# Patient Record
Sex: FEMALE | Race: WHITE
[De-identification: names, ages, dates, MRNs, and addresses within clinical notes are randomized per-mention and may not be internally consistent; named-entity substitution may affect disease eponyms.]

---

## 2019-07-30 ENCOUNTER — HOSPITAL ENCOUNTER (INPATIENT)
Dept: HOSPITAL 10 - L-D | Age: 29
LOS: 3 days | Discharge: HOME | End: 2019-08-02
Attending: OBSTETRICS & GYNECOLOGY | Admitting: OBSTETRICS & GYNECOLOGY
Payer: COMMERCIAL

## 2019-07-30 ENCOUNTER — HOSPITAL ENCOUNTER (INPATIENT)
Dept: HOSPITAL 91 - L-D | Age: 29
LOS: 3 days | Discharge: HOME | End: 2019-08-02
Payer: COMMERCIAL

## 2019-07-30 VITALS — RESPIRATION RATE: 19 BRPM | HEART RATE: 83 BPM | SYSTOLIC BLOOD PRESSURE: 111 MMHG | DIASTOLIC BLOOD PRESSURE: 63 MMHG

## 2019-07-30 VITALS — HEART RATE: 96 BPM | DIASTOLIC BLOOD PRESSURE: 61 MMHG | RESPIRATION RATE: 18 BRPM | SYSTOLIC BLOOD PRESSURE: 106 MMHG

## 2019-07-30 VITALS
WEIGHT: 214.51 LBS | BODY MASS INDEX: 39.47 KG/M2 | HEIGHT: 62 IN | BODY MASS INDEX: 39.47 KG/M2 | HEIGHT: 62 IN | WEIGHT: 214.51 LBS

## 2019-07-30 DIAGNOSIS — O34.211: Primary | ICD-10-CM

## 2019-07-30 DIAGNOSIS — Z3A.39: ICD-10-CM

## 2019-07-30 DIAGNOSIS — Z30.2: ICD-10-CM

## 2019-07-30 DIAGNOSIS — E66.9: ICD-10-CM

## 2019-07-30 LAB
ADD MAN DIFF?: NO
BASOPHIL #: 0 10^3/UL (ref 0–0.1)
BASOPHILS %: 0.3 % (ref 0–2)
EOSINOPHILS #: 0.1 10^3/UL (ref 0–0.5)
EOSINOPHILS %: 0.6 % (ref 0–7)
HEMATOCRIT: 35.6 % (ref 37–47)
HEMOGLOBIN: 11.7 G/DL (ref 12–16)
HEPATITIS B SURFACE ANTIGEN: NEGATIVE
IMMATURE GRANS #M: 0.1 10^3/UL (ref 0–0.03)
IMMATURE GRANS % (M): 1.1 % (ref 0–0.43)
INR: 0.9
LYMPHOCYTES #: 1.6 10^3/UL (ref 0.8–2.9)
LYMPHOCYTES %: 17 % (ref 15–51)
MEAN CORPUSCULAR HEMOGLOBIN: 27.5 PG (ref 29–33)
MEAN CORPUSCULAR HGB CONC: 32.9 G/DL (ref 32–37)
MEAN CORPUSCULAR VOLUME: 83.6 FL (ref 82–101)
MEAN PLATELET VOLUME: 8.6 FL (ref 7.4–10.4)
MONOCYTE #: 0.8 10^3/UL (ref 0.3–0.9)
MONOCYTES %: 8.2 % (ref 0–11)
NEUTROPHIL #: 6.9 10^3/UL (ref 1.6–7.5)
NEUTROPHILS %: 72.8 % (ref 39–77)
NUCLEATED RED BLOOD CELLS #: 0 10^3/UL (ref 0–0)
NUCLEATED RED BLOOD CELLS%: 0 /100WBC (ref 0–0)
PARTIAL THROMBOPLASTIN TIME: 29.4 SEC (ref 23–35)
PLATELET COUNT: 328 10^3/UL (ref 140–415)
PROTIME: 12.3 SEC (ref 11.9–14.9)
PT RATIO: 1
RAPID PLASMA REAGIN: NONREACTIVE
RED BLOOD COUNT: 4.26 10^6/UL (ref 4.2–5.4)
RED CELL DISTRIBUTION WIDTH: 13.2 % (ref 11.5–14.5)
WHITE BLOOD COUNT: 9.5 10^3/UL (ref 4.8–10.8)

## 2019-07-30 PROCEDURE — 87340 HEPATITIS B SURFACE AG IA: CPT

## 2019-07-30 PROCEDURE — 86900 BLOOD TYPING SEROLOGIC ABO: CPT

## 2019-07-30 PROCEDURE — 0UB70ZZ EXCISION OF BILATERAL FALLOPIAN TUBES, OPEN APPROACH: ICD-10-PCS | Performed by: OBSTETRICS & GYNECOLOGY

## 2019-07-30 PROCEDURE — 86850 RBC ANTIBODY SCREEN: CPT

## 2019-07-30 PROCEDURE — 85610 PROTHROMBIN TIME: CPT

## 2019-07-30 PROCEDURE — 85730 THROMBOPLASTIN TIME PARTIAL: CPT

## 2019-07-30 PROCEDURE — 0UB70ZZ EXCISION OF BILATERAL FALLOPIAN TUBES, OPEN APPROACH: ICD-10-PCS

## 2019-07-30 PROCEDURE — 85025 COMPLETE CBC W/AUTO DIFF WBC: CPT

## 2019-07-30 PROCEDURE — 88302 TISSUE EXAM BY PATHOLOGIST: CPT

## 2019-07-30 PROCEDURE — 86901 BLOOD TYPING SEROLOGIC RH(D): CPT

## 2019-07-30 PROCEDURE — 86592 SYPHILIS TEST NON-TREP QUAL: CPT

## 2019-07-30 RX ADMIN — IBUPROFEN SCH MG: 800 TABLET ORAL at 22:00

## 2019-07-30 RX ADMIN — CEFAZOLIN SODIUM 1 MLS/HR: 2 SOLUTION INTRAVENOUS at 21:13

## 2019-07-30 RX ADMIN — DOCUSATE SODIUM AND SENNOSIDES SCH TAB: 8.6; 5 TABLET, FILM COATED ORAL at 21:16

## 2019-07-30 RX ADMIN — KETOROLAC TROMETHAMINE 1 MG: 30 INJECTION, SOLUTION INTRAMUSCULAR at 18:28

## 2019-07-30 RX ADMIN — Medication 1 MLS/HR: at 21:16

## 2019-07-30 RX ADMIN — Medication 1 APPLIC: at 21:13

## 2019-07-30 RX ADMIN — IBUPROFEN 1 MG: 800 TABLET, FILM COATED ORAL at 22:00

## 2019-07-30 RX ADMIN — ACETAMINOPHEN 1 MG: 500 TABLET, FILM COATED ORAL at 18:39

## 2019-07-30 RX ADMIN — CEFAZOLIN SODIUM 1 MLS/HR: 2 SOLUTION INTRAVENOUS at 19:31

## 2019-07-30 RX ADMIN — AZITHROMYCIN MONOHYDRATE 1 MLS/HR: 500 INJECTION, POWDER, LYOPHILIZED, FOR SOLUTION INTRAVENOUS at 18:39

## 2019-07-30 RX ADMIN — PYRIDOXINE HYDROCHLORIDE 1 MLS/HR: 100 INJECTION, SOLUTION INTRAMUSCULAR; INTRAVENOUS at 19:59

## 2019-07-30 RX ADMIN — DOCUSATE SODIUM AND SENNOSIDES 1 TAB: 8.6; 5 TABLET, FILM COATED ORAL at 21:16

## 2019-07-30 NOTE — HP
Date/Time of Note


Date/Time of Note


DATE: 19 


TIME: 16:50





OB - History


Hx of Present Pregnancy


Free Text/Dictation


28-year-old female  3 para 2 with previous  x1 at 39+ weeks 


admitted for repeat section and a sterilization procedure


Last Menstrual Period:  Oct 30, 2018


Estimated Due Date:  Aug 6, 2019


:  3


Para:  2


Prenatal Care:  Good Care


Ultrasounds:  Normal mid trimester US


Obstetrical Complications:  None


Medical Complications:  None, Other (Previous  )





Past Family/Social History


*


Past Medical, Surgical, Family and Obstetric Histories reviewed from prenatal 


chart.


Blood Type:  O+


Rubella:  immune


RPR/VDRL:  Negative


GBS Status:  Negative


HBsAG:  Negative





OB  Admission Exam


Physical Exam


HEENT:  WNL


Heart:  Rhythm Normal


Lungs:  Clear, Equal


Abdomen:  WNL


Extremities:  Normal


Reflexes:  Normal


Cervical Dilatation:  None


Effacement:  0%


Station:  -3


Membranes:  Intact


Fetal Heart Rate:  140's


Accelerations:  Accelerations Present


Decelerations:  No Decelerations


Varibility:  Marked


Contractions on Admission:  None


Last 72 hours Lab Results


                                    CBC & BMP


19 14:30











OB  Assessment/Plan


Reason for admission:   section


Other Assessment:


Term gestation


Previous 


Desires sterilization


Other plan:


Repeat  and tubal ligation











ASHLEE LEONARDO MD   2019 16:54

## 2019-07-30 NOTE — PREAC
Date/Time of Note


Date/Time of Note


DATE: 7/30/19 


TIME: 14:54





Anesthesia Eval and Record


Evaluation


Time Pre-Procedure Interview


DATE: 7/30/19 


TIME: 14:54


Age


28


Sex


female


NPO:  8 hrs


Preoperative diagnosis


IUP AT 39 WEEKS


Planned procedure


REPEAT C SECTION





Past Medical History


Past Medical History:  Includes


GI:  Obesity





Surgery & Anesthesia Issues


No known issue





Meds


Anticoagulation:  No


Beta Blocker within 24 hr:  No


Reason Beta Blocker not given:  Pt. not on B-Blocker





Current Medications


Lactated Ringer's 1,000 ml @  125 mls/hr Q8H IV ;  Start 7/30/19 at 14:07


Cefazolin Sodium/ Dextrose 50 ml @  100 mls/hr ONCE IVPB ;  Start 7/30/19 at 


14:30


Oxytocin/Lactated Ringer's 500 ml @  125 mls/hr POST PARTUM IV ;  Start 7/30/19 


at 14:30


Oxytocin/Lactated Ringer's 500 ml @ 0 mls/hr ONCE PRN IV .VAGINAL BLEEDING;  


Start 7/30/19 at 14:30


Methylergonovine Maleate (Methergine) 0.2 mg ONCE PRN IM .VAGINAL BLEEDING;  


Start 7/30/19 at 14:30


Carboprost Tromethamine (Hemabate) 250 mcg ONCE PRN IM .VAGINAL BLEEDING;  Start


7/30/19 at 14:30


Misoprostol (Cytotec) 1,000 mcg ONCE PRN WI .VAGINAL BLEEDING;  Start 7/30/19 at


14:30


Meds reviewed:  Yes





Allergies


Coded Allergies:  


     No Known Allergies (Verified  Allergy, Unknown, 7/30/19)


Allergies Reviewed:  Yes





Labs/Studies


Labs Reviewed:  Reviewed by anesthesiologist


Pregnancy test:  Positive





Pre-procedure Exam


Airway:  Adequate mouth opening, Adequate thyromental dist


Mallampati:  Mallampati II


Teeth:  Normal


Lung:  Normal


Heart:  Normal





ASA Physical Status


ASA physical status:  2


Emergency:  None





Planned Anesthetic


Neuraxial:  Spinal





Planned Pain Management


Sub-arachniod narcotics





Pre-operative Attestations


Prior to commencing anesthesia and surgery, the patient was re-evaluated, there 


was verification of:


*The patient's identity


*The results of appropriate recent lab work and preoperative vital signs


*The above evaluation not changing prior to induction


*Anesthetic plan, risk benefits, alternative and complications discussed with 


patient/family; questions answered; patient/family understands, accepts and 


wishes to proceed.











MAGO GENTILE                 Jul 30, 2019 14:55

## 2019-07-30 NOTE — OPR
Operative Report


Planned Procedure


Procedure date


2019


Procedure(s)


Repeat  and bilateral tubal ligation


Performed by


see signature line


Assistant:  CHRIS ARAGON M.D.


Anesthesiologist:  MAGO GENTILE


Pre-procedure diagnosis


Term gestation


Previous 


Desired sterilization


                 [id number]
Anesthesia Type:  Clsqy6m
spinal








Post-Procedure


Post-procedure diagnosis


Status post repeat  and tubal ligation


Findings


Live Baby in MARIAMA position


Clear amniotic fluid


Normal-appearing right and left fallopian tubes and ovaries


Estimated Blood Loss:  500 - 600 mls


Specimen(s)


Segments of right and left fallopian tubes


Grafts/Implant(s)


none


Complication(s)


none


Pt Condition post procedure:  stable


Disposition:  PACU


Procedure Description


Under satisfactory anaesthesia a Pfannenstiel incision was made two 


fingerbreadth above and parallel to the symphysis of pubis around the previous 


scar and previous scar was removed 


Incision was extended laterally to the border of the Recti muscles on either 


sides. Incision was carried down with sharp and blunt dissection until fascia 


was reached. Anterior Recti muscle fascia was incised in mid portion and 


incision extended laterally to the border of skin incision. Fascia was mobilized


from muscle superiorly and Recti muscles were  from midline using sharp


and blunt dissection.


Peritoneum was visualized; Avoiding bowel and bladder it was incised . Incision 


was extended superiorly and inferiorly. Bladder blade was placed. Posterior 


peritoneum covering the lower segment of the uterus and lower segment of the 


uterus were incised.Low transverse uterine incision was made on lower segment of


the uterus. Incision extended laterally to the border of Round Lig. on either 


sides and baby was delivered from  OT.  position . Amniotic fluid appeared 


clear. Cord blood was obtained and cord had 3 vessels . 


Placenta was delivered spontaneously and appeared intact and complete.


Intrauterine cavity was rubbed with a laparotomy sponge. Uterine incision was 


closed in 2 layers using running stitches of No1 Monocryl. Hemostasis appeared 


secure. Ovaries and Fallopian tubes were within normal limits.


Bilateral Tubal Ligation was performed by following procedure: R fallopian tube 


was raised in mid portion; a Hilary clamp was placed below the fimbriae extending


to proximal portion of the fallopian tube. Another clamp was placed parallel to 


the first and after incising the fallopian tube the stump was sutured using  0 


Vicryl stitch. Hemostasis was secure . Same procedure was done on fallopian tube


on the opposite side. Hemostasis appeared to be secure on ligated sites of 


either fallopian tubes.


Announcing needle, lap sponge and instrument count to be correct abdomen was 


closed in layers as follows:


Peritoneum and Recti muscles with running stitches of 2-0 Vicryl. Fascia with 


running stitch of No 1 PDS. Subcutaneous tissue with running stitches of 2-0 


Monocryl and skin was closed using staples. 


Patient tolerated the procedure well and was transferred to [facility] in good 


condition.











ASHLEE LEONARDO MD   2019 16:56

## 2019-07-31 VITALS — DIASTOLIC BLOOD PRESSURE: 59 MMHG | SYSTOLIC BLOOD PRESSURE: 101 MMHG | RESPIRATION RATE: 18 BRPM | HEART RATE: 75 BPM

## 2019-07-31 VITALS — SYSTOLIC BLOOD PRESSURE: 98 MMHG | DIASTOLIC BLOOD PRESSURE: 52 MMHG | HEART RATE: 67 BPM | RESPIRATION RATE: 19 BRPM

## 2019-07-31 VITALS — DIASTOLIC BLOOD PRESSURE: 68 MMHG | SYSTOLIC BLOOD PRESSURE: 112 MMHG | RESPIRATION RATE: 17 BRPM | HEART RATE: 78 BPM

## 2019-07-31 VITALS — DIASTOLIC BLOOD PRESSURE: 58 MMHG | SYSTOLIC BLOOD PRESSURE: 99 MMHG | HEART RATE: 73 BPM | RESPIRATION RATE: 18 BRPM

## 2019-07-31 VITALS — DIASTOLIC BLOOD PRESSURE: 58 MMHG | SYSTOLIC BLOOD PRESSURE: 102 MMHG | HEART RATE: 76 BPM | RESPIRATION RATE: 18 BRPM

## 2019-07-31 VITALS — DIASTOLIC BLOOD PRESSURE: 63 MMHG | SYSTOLIC BLOOD PRESSURE: 111 MMHG | HEART RATE: 87 BPM | RESPIRATION RATE: 18 BRPM

## 2019-07-31 LAB
ADD MAN DIFF?: NO
BASOPHIL #: 0 10^3/UL (ref 0–0.1)
BASOPHILS %: 0.1 % (ref 0–2)
EOSINOPHILS #: 0 10^3/UL (ref 0–0.5)
EOSINOPHILS %: 0 % (ref 0–7)
HEMATOCRIT: 29 % (ref 37–47)
HEMOGLOBIN: 9.6 G/DL (ref 12–16)
IMMATURE GRANS #M: 0.12 10^3/UL (ref 0–0.03)
IMMATURE GRANS % (M): 0.8 % (ref 0–0.43)
LYMPHOCYTES #: 1.4 10^3/UL (ref 0.8–2.9)
LYMPHOCYTES %: 9.1 % (ref 15–51)
MEAN CORPUSCULAR HEMOGLOBIN: 28 PG (ref 29–33)
MEAN CORPUSCULAR HGB CONC: 33.1 G/DL (ref 32–37)
MEAN CORPUSCULAR VOLUME: 84.5 FL (ref 82–101)
MEAN PLATELET VOLUME: 8.7 FL (ref 7.4–10.4)
MONOCYTE #: 1.1 10^3/UL (ref 0.3–0.9)
MONOCYTES %: 7.7 % (ref 0–11)
NEUTROPHIL #: 12.3 10^3/UL (ref 1.6–7.5)
NEUTROPHILS %: 82.3 % (ref 39–77)
NUCLEATED RED BLOOD CELLS #: 0 10^3/UL (ref 0–0)
NUCLEATED RED BLOOD CELLS%: 0 /100WBC (ref 0–0)
PLATELET COUNT: 291 10^3/UL (ref 140–415)
RED BLOOD COUNT: 3.43 10^6/UL (ref 4.2–5.4)
RED CELL DISTRIBUTION WIDTH: 13.2 % (ref 11.5–14.5)
WHITE BLOOD COUNT: 14.9 10^3/UL (ref 4.8–10.8)

## 2019-07-31 RX ADMIN — PYRIDOXINE HYDROCHLORIDE SCH MLS/HR: 100 INJECTION, SOLUTION INTRAMUSCULAR; INTRAVENOUS at 11:42

## 2019-07-31 RX ADMIN — CLINDAMYCIN HYDROCHLORIDE 1 MG: 300 CAPSULE ORAL at 18:44

## 2019-07-31 RX ADMIN — CLINDAMYCIN HYDROCHLORIDE 1 MG: 300 CAPSULE ORAL at 11:43

## 2019-07-31 RX ADMIN — IBUPROFEN SCH MG: 800 TABLET ORAL at 14:00

## 2019-07-31 RX ADMIN — CLINDAMYCIN HYDROCHLORIDE SCH MG: 300 CAPSULE ORAL at 11:43

## 2019-07-31 RX ADMIN — IBUPROFEN 1 MG: 800 TABLET, FILM COATED ORAL at 14:00

## 2019-07-31 RX ADMIN — KETOROLAC TROMETHAMINE PRN MG: 30 INJECTION, SOLUTION INTRAMUSCULAR at 04:16

## 2019-07-31 RX ADMIN — IBUPROFEN 1 MG: 800 TABLET, FILM COATED ORAL at 06:00

## 2019-07-31 RX ADMIN — CLINDAMYCIN HYDROCHLORIDE 1 MG: 300 CAPSULE ORAL at 23:42

## 2019-07-31 RX ADMIN — CEFAZOLIN SODIUM 1 MLS/HR: 2 SOLUTION INTRAVENOUS at 04:16

## 2019-07-31 RX ADMIN — PYRIDOXINE HYDROCHLORIDE 1 MLS/HR: 100 INJECTION, SOLUTION INTRAMUSCULAR; INTRAVENOUS at 18:45

## 2019-07-31 RX ADMIN — OXYCODONE HYDROCHLORIDE AND ACETAMINOPHEN 1 TAB: 5; 325 TABLET ORAL at 18:59

## 2019-07-31 RX ADMIN — CEFAZOLIN SODIUM 1 MLS/HR: 2 SOLUTION INTRAVENOUS at 11:42

## 2019-07-31 RX ADMIN — IBUPROFEN 1 MG: 800 TABLET, FILM COATED ORAL at 22:11

## 2019-07-31 RX ADMIN — PYRIDOXINE HYDROCHLORIDE 1 MLS/HR: 100 INJECTION, SOLUTION INTRAMUSCULAR; INTRAVENOUS at 11:42

## 2019-07-31 RX ADMIN — CLINDAMYCIN HYDROCHLORIDE SCH MG: 300 CAPSULE ORAL at 18:44

## 2019-07-31 RX ADMIN — CLINDAMYCIN HYDROCHLORIDE SCH MG: 300 CAPSULE ORAL at 05:38

## 2019-07-31 RX ADMIN — CLINDAMYCIN HYDROCHLORIDE SCH MG: 300 CAPSULE ORAL at 00:45

## 2019-07-31 RX ADMIN — KETOROLAC TROMETHAMINE PRN MG: 30 INJECTION, SOLUTION INTRAMUSCULAR at 12:36

## 2019-07-31 RX ADMIN — CLINDAMYCIN HYDROCHLORIDE 1 MG: 300 CAPSULE ORAL at 00:45

## 2019-07-31 RX ADMIN — DOCUSATE SODIUM AND SENNOSIDES SCH TAB: 8.6; 5 TABLET, FILM COATED ORAL at 22:10

## 2019-07-31 RX ADMIN — CLINDAMYCIN HYDROCHLORIDE SCH MG: 300 CAPSULE ORAL at 23:42

## 2019-07-31 RX ADMIN — DOCUSATE SODIUM AND SENNOSIDES SCH TAB: 8.6; 5 TABLET, FILM COATED ORAL at 09:00

## 2019-07-31 RX ADMIN — PYRIDOXINE HYDROCHLORIDE SCH MLS/HR: 100 INJECTION, SOLUTION INTRAMUSCULAR; INTRAVENOUS at 18:45

## 2019-07-31 RX ADMIN — DOCUSATE SODIUM AND SENNOSIDES 1 TAB: 8.6; 5 TABLET, FILM COATED ORAL at 09:00

## 2019-07-31 RX ADMIN — IBUPROFEN SCH MG: 800 TABLET ORAL at 06:00

## 2019-07-31 RX ADMIN — KETOROLAC TROMETHAMINE 1 MG: 30 INJECTION, SOLUTION INTRAMUSCULAR at 12:36

## 2019-07-31 RX ADMIN — Medication 1 MG: at 15:39

## 2019-07-31 RX ADMIN — PYRIDOXINE HYDROCHLORIDE SCH MLS/HR: 100 INJECTION, SOLUTION INTRAMUSCULAR; INTRAVENOUS at 02:48

## 2019-07-31 RX ADMIN — PYRIDOXINE HYDROCHLORIDE 1 MLS/HR: 100 INJECTION, SOLUTION INTRAMUSCULAR; INTRAVENOUS at 02:48

## 2019-07-31 RX ADMIN — CLINDAMYCIN HYDROCHLORIDE 1 MG: 300 CAPSULE ORAL at 05:38

## 2019-07-31 RX ADMIN — IBUPROFEN SCH MG: 800 TABLET ORAL at 22:11

## 2019-07-31 RX ADMIN — DOCUSATE SODIUM AND SENNOSIDES 1 TAB: 8.6; 5 TABLET, FILM COATED ORAL at 22:10

## 2019-07-31 RX ADMIN — KETOROLAC TROMETHAMINE 1 MG: 30 INJECTION, SOLUTION INTRAMUSCULAR at 04:16

## 2019-07-31 NOTE — PN
Date/Time of Note


Date/Time of Note


DATE: 19 


TIME: 20:05





Assessment/Plan


VTE Prophylaxis


VTE Prophylaxis Intervention:  ambulation





Lines/Catheters


IV Catheter Type (from Nrsg):  Peripheral IV





Assessment/Plan


Assessment/Plan


Status post  postop day #1


Continue to ambulate and advance diet





Subjective


24 Hr Interval Summary


Passing flatus


Constitutional:  no complaints, improved, ambulates, BM, flatus, urine output


Pain Control:  well controlled





Exam/Review of Systems


Vital Signs


Vitals





Vital Signs


  Date      Temp  Pulse  Resp  B/P (MAP)   Pulse Ox  O2          O2 Flow    FiO2


Time                                                 Delivery    Rate


   19  98.2     76    18      102/58        96  Room Air


     15:39                           (73)








Intake and Output





19





1515:00


23:00


07:00





IntakeIntake Total


200 ml


400 ml





OutputOutput Total


250 ml


900 ml





BalanceBalance


-50 ml


-500 ml














Exam


Free Text/Dictation


Abdomen is soft with present bowel sounds


Dominant does not seem distended


Incision is covered


Constitutional:  alert, oriented, well developed


Psych:  no complaints, nl mood/affect


Head:  normocephalic, atraumatic


Eyes:  nl conjunctiva, EOMI, nl lids, nl sclera


ENMT:  nl external ears & nose, nl lips & teeth, nl nasal mucosa & septum, 


mucosa pink and moist


Neck:  supple, non-tender


Respiratory:  clear to auscultation, normal air movement


Cardiovascular:  regular rate and rhythm, nl pulses


Gastrointestinal:  soft, nl liver, spleen, non-tender


Musculoskeletal:  nl extremities to inspection, nl gait and stance


Extremities:  normal pulses


Neurological:  CNS II-XII intact, nl mental status, nl speech, nl strength


Skin:  nl turgor, rash or lesions


Lymph:  nl lymph nodes





Results


Result Diagram:  


[id number]














ASHLEE LEONARDO MD   2019 20:06

## 2019-07-31 NOTE — OPPN
Date/Time of Note


Date/Time of Note


DATE: 7/31/19 


TIME: 10:18





Anesthesia Follow up


Anesthesia Follow up


Last documented vital signs





Vital Signs


  Date      Temp  Pulse  Resp  B/P (MAP)   Pulse Ox  O2          O2 Flow    FiO2


Time                                                 Delivery    Rate


   7/31/19  97.8     73    18  99/58 (72)        95  Room Air


     08:30





Respiratory function:  WNL


Cardiovascular function:  WNL


Comments


satisfactory pain management with duramorph











MAGO GENTILE                 Jul 31, 2019 10:19

## 2019-07-31 NOTE — PAC
Date/Time of Note


Date/Time of Note


DATE: 7/31/19 


TIME: 10:18





Post-Anesthesia Notes


Post-Anesthesia Note


Last documented vital signs





Vital Signs


  Date      Temp  Pulse  Resp  B/P (MAP)   Pulse Ox  O2          O2 Flow    FiO2


Time                                                 Delivery    Rate


   7/31/19  97.8     73    18  99/58 (72)        95  Room Air


     08:30





Activity:  WNL


Respiratory function:  WNL


Cardiovascular function:  WNL


Mental status:  Baseline


Pain reasonably controlled:  Yes


Hydration appropriate:  Yes


Nausea/Vomiting absent:  Yes











MAGO GENTILE                 Jul 31, 2019 10:18

## 2019-08-01 VITALS — DIASTOLIC BLOOD PRESSURE: 77 MMHG | HEART RATE: 80 BPM | SYSTOLIC BLOOD PRESSURE: 108 MMHG | RESPIRATION RATE: 18 BRPM

## 2019-08-01 VITALS — SYSTOLIC BLOOD PRESSURE: 108 MMHG | HEART RATE: 82 BPM | RESPIRATION RATE: 18 BRPM | DIASTOLIC BLOOD PRESSURE: 67 MMHG

## 2019-08-01 VITALS — SYSTOLIC BLOOD PRESSURE: 124 MMHG | RESPIRATION RATE: 18 BRPM | DIASTOLIC BLOOD PRESSURE: 70 MMHG | HEART RATE: 85 BPM

## 2019-08-01 VITALS — RESPIRATION RATE: 18 BRPM | SYSTOLIC BLOOD PRESSURE: 103 MMHG | HEART RATE: 85 BPM | DIASTOLIC BLOOD PRESSURE: 59 MMHG

## 2019-08-01 LAB
ADD MAN DIFF?: NO
BASOPHIL #: 0.1 10^3/UL (ref 0–0.1)
BASOPHILS %: 0.4 % (ref 0–2)
EOSINOPHILS #: 0.2 10^3/UL (ref 0–0.5)
EOSINOPHILS %: 1.2 % (ref 0–7)
HEMATOCRIT: 30.1 % (ref 37–47)
HEMOGLOBIN: 10 G/DL (ref 12–16)
IMMATURE GRANS #M: 0.14 10^3/UL (ref 0–0.03)
IMMATURE GRANS % (M): 1 % (ref 0–0.43)
LYMPHOCYTES #: 2.3 10^3/UL (ref 0.8–2.9)
LYMPHOCYTES %: 16.2 % (ref 15–51)
MEAN CORPUSCULAR HEMOGLOBIN: 27.9 PG (ref 29–33)
MEAN CORPUSCULAR HGB CONC: 33.2 G/DL (ref 32–37)
MEAN CORPUSCULAR VOLUME: 84.1 FL (ref 82–101)
MEAN PLATELET VOLUME: 8.2 FL (ref 7.4–10.4)
MONOCYTE #: 1 10^3/UL (ref 0.3–0.9)
MONOCYTES %: 7.2 % (ref 0–11)
NEUTROPHIL #: 10.6 10^3/UL (ref 1.6–7.5)
NEUTROPHILS %: 74 % (ref 39–77)
NUCLEATED RED BLOOD CELLS #: 0 10^3/UL (ref 0–0)
NUCLEATED RED BLOOD CELLS%: 0 /100WBC (ref 0–0)
PLATELET COUNT: 319 10^3/UL (ref 140–415)
RED BLOOD COUNT: 3.58 10^6/UL (ref 4.2–5.4)
RED CELL DISTRIBUTION WIDTH: 13.2 % (ref 11.5–14.5)
WHITE BLOOD COUNT: 14.3 10^3/UL (ref 4.8–10.8)

## 2019-08-01 RX ADMIN — IBUPROFEN 1 MG: 800 TABLET, FILM COATED ORAL at 13:47

## 2019-08-01 RX ADMIN — CLINDAMYCIN HYDROCHLORIDE 1 MG: 300 CAPSULE ORAL at 05:37

## 2019-08-01 RX ADMIN — IBUPROFEN SCH MG: 800 TABLET ORAL at 21:31

## 2019-08-01 RX ADMIN — DOCUSATE SODIUM AND SENNOSIDES 1 TAB: 8.6; 5 TABLET, FILM COATED ORAL at 09:14

## 2019-08-01 RX ADMIN — CLINDAMYCIN HYDROCHLORIDE 1 MG: 300 CAPSULE ORAL at 12:22

## 2019-08-01 RX ADMIN — DOCUSATE SODIUM AND SENNOSIDES SCH TAB: 8.6; 5 TABLET, FILM COATED ORAL at 09:14

## 2019-08-01 RX ADMIN — DOCUSATE SODIUM AND SENNOSIDES SCH TAB: 8.6; 5 TABLET, FILM COATED ORAL at 21:00

## 2019-08-01 RX ADMIN — IBUPROFEN SCH MG: 800 TABLET ORAL at 05:37

## 2019-08-01 RX ADMIN — IBUPROFEN SCH MG: 800 TABLET ORAL at 13:47

## 2019-08-01 RX ADMIN — DOCUSATE SODIUM AND SENNOSIDES 1 TAB: 8.6; 5 TABLET, FILM COATED ORAL at 21:00

## 2019-08-01 RX ADMIN — CLINDAMYCIN HYDROCHLORIDE SCH MG: 300 CAPSULE ORAL at 12:22

## 2019-08-01 RX ADMIN — IBUPROFEN 1 MG: 800 TABLET, FILM COATED ORAL at 05:37

## 2019-08-01 RX ADMIN — CLINDAMYCIN HYDROCHLORIDE SCH MG: 300 CAPSULE ORAL at 05:37

## 2019-08-01 RX ADMIN — CLINDAMYCIN HYDROCHLORIDE SCH MG: 300 CAPSULE ORAL at 18:11

## 2019-08-01 RX ADMIN — IBUPROFEN 1 MG: 800 TABLET, FILM COATED ORAL at 21:31

## 2019-08-01 RX ADMIN — CLINDAMYCIN HYDROCHLORIDE 1 MG: 300 CAPSULE ORAL at 18:11

## 2019-08-01 NOTE — PD.PPDC
OB/GYN Discharge Instruction


Provider Information


Physician Information


28-year-old female underwent repeat  section and tubal ligation





Diagnosis


[id number]
Final Diagnosis:  [id number]
Status post repeat  and tubal


                             ligation








Condition


                 Sndky4Zg
Patient Condition:  Cjgwf2r
Good








Diet


                Lrhom9Ai
Diet:  Bhnmz0c
Resume Regular Diet








Activity/Restrictions


         [id number]
Restrictions:              Defht9l
No Exercising


                                               No Lifting


                                               Nothing in the Vagina


         Pbdqf4Kv
Return to Work or School:  Ezffp9c
Oct 7, 2019








Wound/Drain Care Instructions


    [id number]
Wound/Drain Care Instructions:  Pexrf8w
Keep clean and dry








Follow-up


Follow-up with Physician:  3, 4, Day/Days (In clinic for staple removal)





Return to clinic for


            Sstch6Yl
OB Instructions:  Ycaqm7m
Breast Tenderness


                                         Depression





Comment:


Pelvic rest and no hard activity for 2 months


        Vpuzp5Sw
Surgical Instructions:  Gbygf4y
Incisional Drainage


                                           Incisional Redness














ASHLEE LEONARDO MD    Aug 1, 2019 13:11

## 2019-08-01 NOTE — DS
Date/Time of Note


Date/Time of Note


DATE: 19 


TIME: 12:46





Discharge Summary


Admission/Discharge Info


Admit Date/Time


2019 at 13:02


Discharge Date/Time





Discharge Diagnosis


Status post repeat  and tubal ligation


Patient Condition:  Good


Procedures


Repeat  section and bilateral tubal ligation


Hx of Present Illness


28-year-old female underwent scheduled repeat  section and tubal 


ligation at term


Hospital Course


She had uncomplicated hospitalization course


Discharge home on second or third postpartum day with good prognosis and 


condition


Follow-up Plan


Was told to refer to clinic in few days for staple removal


Primary Care Provider


Bart Batista MD


Time spent on discharge:   > 30 minutes


Pending Labs





Laboratory Tests


         Test
                                             19
06:52


         White Blood Count
                     14.3 10^3/ul
(4.8-10.8)


         Red Blood Count
                      3.58 10^6/ul
(4.20-5.40)


         Hemoglobin
                              10.0 g/dl
(12.0-16.0)


         Hematocrit
                                 30.1 %
(37.0-47.0)


         Mean Corpuscular Volume
                  84.1 fl
(82.0-101.0)


         Mean Corpuscular Hemoglobin
               27.9 pg
(29.0-33.0)


         Mean Corpuscular Hemoglobin
Concent      33.2 g/dl
(32.0-37.0)


         Red Cell Distribution Width
                13.2 %
(11.5-14.5)


         Platelet Count
                          319 10^3/UL
(140-415)


         Mean Platelet Volume
                        8.2 fl
(7.4-10.4)


         Immature Granulocytes %
                 1.000 %
(0.001-0.429)


         Neutrophils %
                              74.0 %
(39.0-77.0)


         Lymphocytes %
                              16.2 %
(15.0-51.0)


         Monocytes %
                                  7.2 %
(0.0-11.0)


         Eosinophils %
                                 1.2 %
(0.0-7.0)


         Basophils %
                                   0.4 %
(0.0-2.0)


         Nucleated Red Blood Cells %
             0.0 /100WBC
(0.0-0.0)


         Immature Granulocytes #
             0.140 10^3/ul
(0.0-0.031)


         Neutrophils #
                          10.6 10^3/ul
(1.6-7.5)


         Lymphocytes #
                           2.3 10^3/ul
(0.8-2.9)


         Monocytes #
                             1.0 10^3/ul
(0.3-0.9)


         Eosinophils #
                           0.2 10^3/ul
(0.0-0.5)


         Basophils #
                             0.1 10^3/ul
(0.0-0.1)


         Nucleated Red Blood Cells #
             0.0 10^3/ul
(0.0-0.0)














ASHLEE LEONARDO MD    Aug 1, 2019 12:52

## 2019-08-01 NOTE — DS
Date/Time of Note


Date/Time of Note


Home today or next day


DATE: 19 


TIME: 12:42





Obstetrical Discharge Record


Final Diagnosis


Final Diagnosis:  Term delivered


Other Final Diagnosis


Status post repeat  section and bilateral tubal ligation





Vaginal Delivery


Obstetrical Delivery:  Bilateral Tubal Ligation





 Section


 Section:  Repeat





Condition on Discharge


Physical Assessment


Last Vitals:


See nurse's notes


Voiding:  Yes


Bowel Movement:  Yes


Breast:  Soft, non-tender, Filling


Fundus:  Firm


Abdomen and Incision:


Abdomen is soft with firm fundus


Bowel sounds are present and abdomen is not distended


Incision is healing well without induration and no erythema


Calf Tenderness:  No


Patient Condition:  Good











ASHLEE LENOARDO MD    Aug 1, 2019 12:46

## 2019-08-02 VITALS — SYSTOLIC BLOOD PRESSURE: 109 MMHG | HEART RATE: 81 BPM | DIASTOLIC BLOOD PRESSURE: 68 MMHG | RESPIRATION RATE: 18 BRPM

## 2019-08-02 VITALS — HEART RATE: 83 BPM | RESPIRATION RATE: 18 BRPM | DIASTOLIC BLOOD PRESSURE: 81 MMHG | SYSTOLIC BLOOD PRESSURE: 117 MMHG

## 2019-08-02 LAB
ADD MAN DIFF?: NO
BASOPHIL #: 0.1 10^3/UL (ref 0–0.1)
BASOPHILS %: 0.7 % (ref 0–2)
EOSINOPHILS #: 0.2 10^3/UL (ref 0–0.5)
EOSINOPHILS %: 1.7 % (ref 0–7)
HEMATOCRIT: 30 % (ref 37–47)
HEMOGLOBIN: 9.9 G/DL (ref 12–16)
IMMATURE GRANS #M: 0.12 10^3/UL (ref 0–0.03)
IMMATURE GRANS % (M): 1 % (ref 0–0.43)
LYMPHOCYTES #: 2 10^3/UL (ref 0.8–2.9)
LYMPHOCYTES %: 17.3 % (ref 15–51)
MEAN CORPUSCULAR HEMOGLOBIN: 28 PG (ref 29–33)
MEAN CORPUSCULAR HGB CONC: 33 G/DL (ref 32–37)
MEAN CORPUSCULAR VOLUME: 84.7 FL (ref 82–101)
MEAN PLATELET VOLUME: 8.7 FL (ref 7.4–10.4)
MONOCYTE #: 0.8 10^3/UL (ref 0.3–0.9)
MONOCYTES %: 7.1 % (ref 0–11)
NEUTROPHIL #: 8.3 10^3/UL (ref 1.6–7.5)
NEUTROPHILS %: 72.2 % (ref 39–77)
NUCLEATED RED BLOOD CELLS #: 0 10^3/UL (ref 0–0)
NUCLEATED RED BLOOD CELLS%: 0 /100WBC (ref 0–0)
PLATELET COUNT: 339 10^3/UL (ref 140–415)
RED BLOOD COUNT: 3.54 10^6/UL (ref 4.2–5.4)
RED CELL DISTRIBUTION WIDTH: 13.3 % (ref 11.5–14.5)
WHITE BLOOD COUNT: 11.5 10^3/UL (ref 4.8–10.8)

## 2019-08-02 RX ADMIN — CLINDAMYCIN HYDROCHLORIDE SCH MG: 300 CAPSULE ORAL at 05:39

## 2019-08-02 RX ADMIN — CLOSTRIDIUM TETANI TOXOID ANTIGEN (FORMALDEHYDE INACTIVATED), CORYNEBACTERIUM DIPHTHERIAE TOXOID ANTIGEN (FORMALDEHYDE INACTIVATED), BORDETELLA PERTUSSIS TOXOID ANTIGEN (GLUTARALDEHYDE INACTIVATED), BORDETELLA PERTUSSIS FILAMENTOUS HEMAGGLUTININ ANTIGEN (FORMALDEHYDE INACTIVATED), BORDETELLA PERTUSSIS PERTACTIN ANTIGEN, AND BORDETELLA PERTUSSIS FIMBRIAE 2/3 ANTIGEN 1 ML: 5; 2; 2.5; 5; 3; 5 INJECTION, SUSPENSION INTRAMUSCULAR at 07:39

## 2019-08-02 RX ADMIN — CLINDAMYCIN HYDROCHLORIDE 1 MG: 300 CAPSULE ORAL at 05:39

## 2019-08-02 RX ADMIN — CLINDAMYCIN HYDROCHLORIDE 1 MG: 300 CAPSULE ORAL at 00:33

## 2019-08-02 RX ADMIN — DOCUSATE SODIUM AND SENNOSIDES SCH TAB: 8.6; 5 TABLET, FILM COATED ORAL at 09:00

## 2019-08-02 RX ADMIN — IBUPROFEN SCH MG: 800 TABLET ORAL at 05:39

## 2019-08-02 RX ADMIN — DOCUSATE SODIUM AND SENNOSIDES 1 TAB: 8.6; 5 TABLET, FILM COATED ORAL at 09:00

## 2019-08-02 RX ADMIN — CLINDAMYCIN HYDROCHLORIDE 1 MG: 300 CAPSULE ORAL at 12:00

## 2019-08-02 RX ADMIN — CLINDAMYCIN HYDROCHLORIDE SCH MG: 300 CAPSULE ORAL at 12:00

## 2019-08-02 RX ADMIN — HYDROCODONE BITARTRATE AND ACETAMINOPHEN 1 TAB: 5; 325 TABLET ORAL at 09:17

## 2019-08-02 RX ADMIN — CLINDAMYCIN HYDROCHLORIDE SCH MG: 300 CAPSULE ORAL at 00:33

## 2019-08-02 RX ADMIN — MEASLES, MUMPS, AND RUBELLA VIRUS VACCINE LIVE 1 ML: 1000; 12500; 1000 INJECTION, POWDER, LYOPHILIZED, FOR SUSPENSION SUBCUTANEOUS at 07:39

## 2019-08-02 RX ADMIN — IBUPROFEN 1 MG: 800 TABLET, FILM COATED ORAL at 05:39
